# Patient Record
(demographics unavailable — no encounter records)

---

## 2025-06-24 NOTE — PHYSICAL EXAM
[Tired appearing] : tired appearing [Erythema] : no erythema [Erythematous Oropharynx] : erythematous oropharynx [NL] : moves all extremities x4, warm, well perfused x4 [de-identified] : ulcerative lesions on oropharynx [de-identified] : lesion surrounding left nare with yellowish crustiness and oozing, 2 small papules around mouth, erythematous papules on sides and bottom of both feet molluscum contagiosum on interior aspect of both knees

## 2025-06-24 NOTE — HISTORY OF PRESENT ILLNESS
[de-identified] : mom reports pt with scab on nose x4 days along with red bumps around mouth. mom reports pt warm to touch and has low energy. appetite ok. pt afebrile. mom denies any other symptoms [FreeTextEntry6] : Lesion on left nare x 4 days, has gotten worse, now with yellowish crustiness Few small lesions around mouth, not oozing or crusty Raised, red lesions started on bottom and sides of feet starting today Tactile fever, Motrin given this morning Fatigue No vomiting or diarrhea No cough or congestion Decreased appetite, drinking and urinating well Also has flesh colored papules inside of both legs near knees for a few months

## 2025-06-24 NOTE — DISCUSSION/SUMMARY
[FreeTextEntry1] : Discussed with mother Coxsackie virus infection, cause, progression and management Advised supportive care including increased fluids, cool fluids, avoid hot or spicy foods, Tylenol or Motrin for discomfort Will need to stay home until fever free without any antipyretics for 24 hours and lesions have stopped erupting and are resolving Mupirocin prescribed for impetigo on left nare Discussed molluscum contagiosum with mother, no treatment needed, will resolve over time Return to office if symptoms worsen or persist